# Patient Record
Sex: MALE | Race: WHITE | URBAN - METROPOLITAN AREA
[De-identification: names, ages, dates, MRNs, and addresses within clinical notes are randomized per-mention and may not be internally consistent; named-entity substitution may affect disease eponyms.]

---

## 2017-05-15 ENCOUNTER — OFFICE VISIT (OUTPATIENT)
Dept: FAMILY MEDICINE | Facility: CLINIC | Age: 47
End: 2017-05-15
Payer: COMMERCIAL

## 2017-05-15 ENCOUNTER — RADIANT APPOINTMENT (OUTPATIENT)
Dept: GENERAL RADIOLOGY | Facility: CLINIC | Age: 47
End: 2017-05-15
Attending: FAMILY MEDICINE
Payer: COMMERCIAL

## 2017-05-15 VITALS
HEIGHT: 71 IN | BODY MASS INDEX: 31.64 KG/M2 | OXYGEN SATURATION: 98 % | WEIGHT: 226 LBS | SYSTOLIC BLOOD PRESSURE: 131 MMHG | HEART RATE: 75 BPM | TEMPERATURE: 98 F | DIASTOLIC BLOOD PRESSURE: 85 MMHG

## 2017-05-15 DIAGNOSIS — R07.81 RIB PAIN ON LEFT SIDE: ICD-10-CM

## 2017-05-15 DIAGNOSIS — S20.212A CONTUSION OF RIB ON LEFT SIDE, INITIAL ENCOUNTER: Primary | ICD-10-CM

## 2017-05-15 PROCEDURE — 71101 X-RAY EXAM UNILAT RIBS/CHEST: CPT | Mod: LT

## 2017-05-15 PROCEDURE — 99203 OFFICE O/P NEW LOW 30 MIN: CPT | Performed by: FAMILY MEDICINE

## 2017-05-15 RX ORDER — TRAMADOL HYDROCHLORIDE 50 MG/1
50-100 TABLET ORAL EVERY 6 HOURS PRN
Qty: 30 TABLET | Refills: 0 | Status: SHIPPED | OUTPATIENT
Start: 2017-05-15 | End: 2019-06-26

## 2017-05-15 RX ORDER — MELOXICAM 15 MG/1
15 TABLET ORAL DAILY
Qty: 30 TABLET | Refills: 1 | Status: SHIPPED | OUTPATIENT
Start: 2017-05-15 | End: 2019-06-26

## 2017-05-15 RX ORDER — CYCLOBENZAPRINE HCL 10 MG
5-10 TABLET ORAL 3 TIMES DAILY PRN
Qty: 30 TABLET | Refills: 1 | Status: SHIPPED | OUTPATIENT
Start: 2017-05-15 | End: 2019-06-26

## 2017-05-15 ASSESSMENT — PAIN SCALES - GENERAL: PAINLEVEL: EXTREME PAIN (9)

## 2017-05-15 NOTE — LETTER
[facility]  [address] [place] [address]    May 15, 2017    Re: Anurag VARGAS Sulemabenigno  : 1970      To Whom it May Concern,     Please excuse Anurag from work through Monday, May 22. He has injuries to his left rib cage that prevent him from any activities involving lifting, twisting, or bending. He will follow up in clinic in one week for clearance.     Sincerely,         Lauren Engelmann, MD

## 2017-05-15 NOTE — NURSING NOTE
"Chief Complaint   Patient presents with     Rib Pain       Initial /85 (BP Location: Left arm, Patient Position: Chair, Cuff Size: Adult Large)  Pulse 75  Temp 98  F (36.7  C) (Oral)  Ht 5' 11\" (1.803 m)  Wt 226 lb (102.5 kg)  SpO2 98%  BMI 31.52 kg/m2 Estimated body mass index is 31.52 kg/(m^2) as calculated from the following:    Height as of this encounter: 5' 11\" (1.803 m).    Weight as of this encounter: 226 lb (102.5 kg).  Medication Reconciliation: complete   Melinda Herman MA      "
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
## 2017-05-15 NOTE — PATIENT INSTRUCTIONS
You can use meloxicam once daily for pain - do not use more than once per day, and do not use any over the counter ibuprofen while you're taking the medication.     Cyclobenzaprine is a muscle relaxer. Take half to a full tablet every 8 hours as needed for pain. It may make you tired.     Tramadol is a pain medication. It may make you tired, especially if you take it with cyclobenzaprine.     All of the medicines are ok to take on the same day.     Rib Contusion or Minor Fracture    A rib contusion is a bruise to one or more rib bones. It may cause pain, tenderness, swelling, and a purplish tint to the skin. There may be a sharp pain with each breath. A rib contusion takes anywhere from a few days to a few weeks to heal. A minor rib fracture or break may cause the same symptoms as a rib contusion. The small crack may not be seen on a regular chest X-ray. Treatment for both problems is the same.  Home care    You may use over-the-counter pain medicine to control pain, unless another pain medicine was prescribed. If you have chronic liver or kidney disease or ever had a stomach ulcer or GI bleeding, talk with your healthcare provider before using these medicines.    Rest. Do not lift anything heavy or do any activity that causes pain.    Apply an ice pack over the injured area for 15 to 20 minutes every 1 to 2 hours. You should do this for the first 24 to 48 hours. You can make an ice pack by filling a plastic bag that seals at the top with ice cubes and then wrapping it with a thin towel. Continue with ice packs as needed for the relief of pain and swelling.    The first 3 to 4 weeks of healing will be the most painful. If your pain is not under control with the treatment given, call your healthcare provider. Sometimes a stronger pain medicine may be needed. A nerve block can be done in case of severe pain. It will numb the nerve between the ribs.  Follow-up care  Follow up with your healthcare provider, or as  advised.  If X-rays were taken, you will be told of any new findings that may affect your care.  Call 911   Call 911 if you have:    Dizziness, weakness or fainting    Shortness of breath with or without chest discomfort    New or worsening pain  When to seek medical advice  Call your healthcare provider right away if any of these occur:    Fever of 100.4 F (38 C) or above lasting for 24 to 48 hours    Stomach pain    1781-2435 [employer]. [address] [place], [place] [address]. All rights reserved. This information is not intended as a substitute for professional medical care. Always follow your healthcare professional's instructions.

## 2017-05-15 NOTE — MR AVS SNAPSHOT
After Visit Summary   5/15/2017    Anurag Kaye    MRN: [id number]           Patient Information     Date Of Birth          1970        Visit Information        Provider Department      5/15/2017 11:20 AM Engelmann, Lauren Anneliese, MD [facility]        Today's Diagnoses     Rib pain on left side    -  1      Care Instructions    You can use meloxicam once daily for pain - do not use more than once per day, and do not use any over the counter ibuprofen while you're taking the medication.     Cyclobenzaprine is a muscle relaxer. Take half to a full tablet every 8 hours as needed for pain. It may make you tired.     Tramadol is a pain medication. It may make you tired, especially if you take it with cyclobenzaprine.     All of the medicines are ok to take on the same day.     Rib Contusion or Minor Fracture    A rib contusion is a bruise to one or more rib bones. It may cause pain, tenderness, swelling, and a purplish tint to the skin. There may be a sharp pain with each breath. A rib contusion takes anywhere from a few days to a few weeks to heal. A minor rib fracture or break may cause the same symptoms as a rib contusion. The small crack may not be seen on a regular chest X-ray. Treatment for both problems is the same.  Home care    You may use over-the-counter pain medicine to control pain, unless another pain medicine was prescribed. If you have chronic liver or kidney disease or ever had a stomach ulcer or GI bleeding, talk with your healthcare provider before using these medicines.    Rest. Do not lift anything heavy or do any activity that causes pain.    Apply an ice pack over the injured area for 15 to 20 minutes every 1 to 2 hours. You should do this for the first 24 to 48 hours. You can make an ice pack by filling a plastic bag that seals at the top with ice cubes and then wrapping it with a thin towel. Continue with ice packs as needed for the relief of pain  and swelling.    The first 3 to 4 weeks of healing will be the most painful. If your pain is not under control with the treatment given, call your healthcare provider. Sometimes a stronger pain medicine may be needed. A nerve block can be done in case of severe pain. It will numb the nerve between the ribs.  Follow-up care  Follow up with your healthcare provider, or as advised.  If X-rays were taken, you will be told of any new findings that may affect your care.  Call 911   Call 911 if you have:    Dizziness, weakness or fainting    Shortness of breath with or without chest discomfort    New or worsening pain  When to seek medical advice  Call your healthcare provider right away if any of these occur:    Fever of 100.4 F (38 C) or above lasting for 24 to 48 hours    Stomach pain    [phone] [employer]. [address] [place] [address]. All rights reserved. This information is not intended as a substitute for professional medical care. Always follow your healthcare professional's instructions.              Follow-ups after your visit        Your next 10 appointments already scheduled     May 16, 2017  2:40 PM CDT   PHYSICAL with Navin Spence MD   [facility]    [address] [place] [id number] [phone]              Who to contact     If you have questions or need follow up information about today's clinic visit or your schedule please contact [facility] directly at [phone].  Normal or non-critical lab and imaging results will be communicated to you by MyChart, letter or phone within 4 business days after the clinic has received the results. If you do not hear from us within 7 days, please contact the clinic through MyChart or phone. If you have a critical or abnormal lab result, we will notify you by phone as soon as possible.  Submit refill requests through  "[employer] or call your pharmacy and they will forward the refill request to us. Please allow 3 business days for your refill to be completed.          Additional Information About Your Visit        MyChart Information     immatics biotechnologieshart lets you send messages to your doctor, view your test results, renew your prescriptions, schedule appointments and more. To sign up, go to [email or web address] . Click on \"Log in\" on the left side of the screen, which will take you to the Welcome page. Then click on \"Sign up Now\" on the right side of the page.     You will be asked to enter the access code listed below, as well as some personal information. Please follow the directions to create your username and password.     Your access code is: [email or web address]  Expires: 2017 12:06 PM     Your access code will  in 90 days. If you need help or a new code, please call your [facility] clinic or [phone].        Care EveryWhere ID     This is your Care EveryWhere ID. This could be used by other organizations to access your [facility] medical records  [id number]        Your Vitals Were     Pulse Temperature Height Pulse Oximetry BMI (Body Mass Index)       75 98  F (36.7  C) (Oral) 5' 11\" (1.803 m) 98% 31.52 kg/m2        Blood Pressure from Last 3 Encounters:   05/15/17 131/85   13 122/66    Weight from Last 3 Encounters:   05/15/17 226 lb (102.5 kg)   13 197 lb (89.4 kg)               Primary Care Provider    None Specified       No primary provider on file.        Thank you!     Thank you for choosing [facility]  for your care. Our goal is always to provide you with excellent care. Hearing back from our patients is one way we can continue to improve our services. Please take a few minutes to complete the written survey that you may receive in the mail after your visit with us. Thank you!             Your Updated Medication List - Protect others around you: Learn how to safely use, store and throw " away your medicines at www.disposemymeds.org.      Notice  As of 5/15/2017 12:06 PM    You have not been prescribed any medications.

## 2017-05-15 NOTE — PROGRESS NOTES
Patient coming in for physical tomorrow - reviewed prelim results with him today. No change in management.     Lauren Engelmann, MD

## 2017-05-16 ENCOUNTER — OFFICE VISIT (OUTPATIENT)
Dept: FAMILY MEDICINE | Facility: CLINIC | Age: 47
End: 2017-05-16
Payer: COMMERCIAL

## 2017-05-16 VITALS
WEIGHT: 223 LBS | OXYGEN SATURATION: 97 % | DIASTOLIC BLOOD PRESSURE: 74 MMHG | HEART RATE: 72 BPM | TEMPERATURE: 97.7 F | HEIGHT: 72 IN | SYSTOLIC BLOOD PRESSURE: 111 MMHG | BODY MASS INDEX: 30.2 KG/M2

## 2017-05-16 DIAGNOSIS — Z00.00 ROUTINE GENERAL MEDICAL EXAMINATION AT A HEALTH CARE FACILITY: Primary | ICD-10-CM

## 2017-05-16 DIAGNOSIS — E66.3 OVERWEIGHT (BMI 25.0-29.9): ICD-10-CM

## 2017-05-16 PROCEDURE — 99396 PREV VISIT EST AGE 40-64: CPT | Performed by: FAMILY MEDICINE

## 2017-05-16 ASSESSMENT — PAIN SCALES - GENERAL: PAINLEVEL: NO PAIN (0)

## 2017-05-16 NOTE — MR AVS SNAPSHOT
After Visit Summary   5/16/2017    Anurag Kaye    MRN: [id number]           Patient Information     Date Of Birth          1970        Visit Information        Provider Department      5/16/2017 2:40 PM Navin pSence MD [facility]        Today's Diagnoses     Routine general medical examination at a health care facility    -  1    Overweight (BMI 25.0-29.9)          Care Instructions      Preventive Health Recommendations  Male Ages 40 to 49    Yearly exam:             See your health care provider every year in order to  o   Review health changes.   o   Discuss preventive care.    o   Review your medicines if your doctor has prescribed any.    You should be tested each year for STDs (sexually transmitted diseases) if you re at risk.     Have a cholesterol test every 5 years.     Have a colonoscopy (test for colon cancer) if someone in your family has had colon cancer or polyps before age 50.     After age 45, have a diabetes test (fasting glucose). If you are at risk for diabetes, you should have this test every 3 years.      Talk with your health care provider about whether or not a prostate cancer screening test (PSA) is right for you.    Shots: Get a flu shot each year. Get a tetanus shot every 10 years.     Nutrition:    Eat at least 5 servings of fruits and vegetables daily.     Eat whole-grain bread, whole-wheat pasta and brown rice instead of white grains and rice.     Talk to your provider about Calcium and Vitamin D.     Lifestyle    Exercise for at least 150 minutes a week (30 minutes a day, 5 days a week). This will help you control your weight and prevent disease.     Limit alcohol to one drink per day.     No smoking.     Wear sunscreen to prevent skin cancer.     See your dentist every six months for an exam and cleaning.            Follow-ups after your visit        Your next 10 appointments already scheduled     May 22, 2017 11:20 AM CDT   SHORT  "with Navin Spence MD   [facility]    [address] [facility] [address] [phone]              Who to contact     If you have questions or need follow up information about today's clinic visit or your schedule please contact [facility] directly at [phone].  Normal or non-critical lab and imaging results will be communicated to you by MyChart, letter or phone within 4 business days after the clinic has received the results. If you do not hear from us within 7 days, please contact the clinic through MyChart or phone. If you have a critical or abnormal lab result, we will notify you by phone as soon as possible.  Submit refill requests through [employer] or call your pharmacy and they will forward the refill request to us. Please allow 3 business days for your refill to be completed.          Additional Information About Your Visit        Pay by Shopping (deal united)[employer] Information     [employer] lets you send messages to your doctor, view your test results, renew your prescriptions, schedule appointments and more. To sign up, go to [email or web address] . Click on \"Log in\" on the left side of the screen, which will take you to the Welcome page. Then click on \"Sign up Now\" on the right side of the page.     You will be asked to enter the access code listed below, as well as some personal information. Please follow the directions to create your username and password.     Your access code is: [email or web address]  Expires: 2017 12:06 PM     Your access code will  in 90 days. If you need help or a new code, please call your [facility] or [phone].        Care EveryWhere ID     This is your Care EveryWhere ID. This could be used by other organizations to access your [facility] medical records  [id number]        Your Vitals Were     Pulse Temperature Height Pulse Oximetry BMI (Body Mass Index)       72 97.7  F " "(36.5  C) (Oral) 5' 11.5\" (1.816 m) 97% 30.67 kg/m2        Blood Pressure from Last 3 Encounters:   05/16/17 111/74   05/15/17 131/85   09/12/13 122/66    Weight from Last 3 Encounters:   05/16/17 223 lb (101.2 kg)   05/15/17 226 lb (102.5 kg)   09/12/13 197 lb (89.4 kg)              Today, you had the following     No orders found for display       Primary Care Provider    None Specified       No primary provider on file.        Thank you!     Thank you for choosing [facility]  for your care. Our goal is always to provide you with excellent care. Hearing back from our patients is one way we can continue to improve our services. Please take a few minutes to complete the written survey that you may receive in the mail after your visit with us. Thank you!             Your Updated Medication List - Protect others around you: Learn how to safely use, store and throw away your medicines at www.disposemymeds.org.          This list is accurate as of: 5/16/17  3:21 PM.  Always use your most recent med list.                   Brand Name Dispense Instructions for use    cyclobenzaprine 10 MG tablet    FLEXERIL    30 tablet    Take 0.5-1 tablets (5-10 mg) by mouth 3 times daily as needed for muscle spasms       meloxicam 15 MG tablet    MOBIC    30 tablet    Take 1 tablet (15 mg) by mouth daily       traMADol 50 MG tablet    ULTRAM    30 tablet    Take 1-2 tablets ( mg) by mouth every 6 hours as needed for pain maximum 8 tablet(s) per day         "

## 2017-05-16 NOTE — NURSING NOTE
"Chief Complaint   Patient presents with     Physical       Initial /74  Pulse 72  Temp 97.7  F (36.5  C) (Oral)  Ht 5' 11.5\" (1.816 m)  Wt 223 lb (101.2 kg)  SpO2 97%  BMI 30.67 kg/m2 Estimated body mass index is 30.67 kg/(m^2) as calculated from the following:    Height as of this encounter: 5' 11.5\" (1.816 m).    Weight as of this encounter: 223 lb (101.2 kg).  Medication Reconciliation: complete   Madeleine THOMPSON      "

## 2017-05-16 NOTE — PROGRESS NOTES
SUBJECTIVE:     CC: Anurag Kaye is an 47 year old male who presents for preventative health visit.     Healthy Habits:    Do you get at least three servings of calcium containing foods daily (dairy, green leafy vegetables, etc.)? yes    Amount of exercise or daily activities, outside of work: 3 day(s) per week for 1 hr    Problems taking medications regularly No    Medication side effects: No    Have you had an eye exam in the past two years? no    Do you see a dentist twice per year? yes    Do you have sleep apnea, excessive snoring or daytime drowsiness? 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    Last PSA: No results found for: PSA    Recent Labs   Lab Test  09/12/13   1353   CHOL  134   HDL  58   LDL  49   TRIG  134   CHOLHDLRATIO  2.3     Current Outpatient Prescriptions   Medication Sig Dispense Refill     meloxicam (MOBIC) 15 MG tablet Take 1 tablet (15 mg) by mouth daily 30 tablet 1     cyclobenzaprine (FLEXERIL) 10 MG tablet Take 0.5-1 tablets (5-10 mg) by mouth 3 times daily as needed for muscle spasms 30 tablet 1     traMADol (ULTRAM) 50 MG tablet Take 1-2 tablets ( mg) by mouth every 6 hours as needed for pain maximum 8 tablet(s) per day 30 tablet 0       Reviewed orders with patient. Reviewed health maintenance and updated orders accordingly - Yes    Reviewed and updated as needed this visit by clinical staff          Reviewed and updated as needed this visit by Provider        Past Medical History:   Diagnosis Date     Hyperlipidemia LDL goal <160 9/17/2013      Past Surgical History:   Procedure Laterality Date     APPENDECTOMY OPEN      age 18 approximate   right ankle fracture 2009    ROS:  C: NEGATIVE for fever, chills, change in weight  I: NEGATIVE for worrisome rashes, moles or lesions  E: NEGATIVE for vision changes or irritation  ENT: NEGATIVE for ear, mouth and throat problems  R: NEGATIVE for significant cough or SOB  CV: NEGATIVE for chest pain, palpitations or peripheral edema  GI: NEGATIVE for nausea, abdominal pain, heartburn, or change in bowel habits   male: negative for dysuria, hematuria, decreased urinary stream, erectile dysfunction, urethral discharge  M: NEGATIVE for significant arthralgias or myalgia  N: NEGATIVE for weakness, dizziness or paresthesias  P: NEGATIVE for changes in mood or affect    Problem list, Medication list, Allergies, and Medical/Social/Surgical histories reviewed in EPIC and updated as appropriate.  Labs reviewed in EPIC  BP Readings from Last 3 Encounters:   05/16/17 111/74   05/15/17 131/85   09/12/13 122/66    Wt Readings from Last 3 Encounters:   05/16/17 223 lb (101.2 kg)   05/15/17 226 lb (102.5 kg)   09/12/13 197 lb (89.4 kg)                  Patient Active Problem List   Diagnosis     Hyperlipidemia LDL goal <160     Past Surgical History:   Procedure Laterality Date     APPENDECTOMY OPEN      age 18 approximate       Social History   Substance Use Topics     Smoking status: Former Smoker     Types: Cigarettes     Smokeless tobacco: Not on file     Alcohol use Yes     Family History   Problem Relation Age of Onset     DIABETES Maternal Grandmother          Current Outpatient Prescriptions   Medication Sig Dispense Refill     meloxicam (MOBIC) 15 MG tablet Take 1 tablet (15 mg) by mouth daily 30 tablet 1     cyclobenzaprine (FLEXERIL) 10 MG tablet Take 0.5-1 tablets (5-10  "mg) by mouth 3 times daily as needed for muscle spasms 30 tablet 1     traMADol (ULTRAM) 50 MG tablet Take 1-2 tablets ( mg) by mouth every 6 hours as needed for pain maximum 8 tablet(s) per day 30 tablet 0     No Known Allergies  OBJECTIVE:     /74  Pulse 72  Temp 97.7  F (36.5  C) (Oral)  Ht 5' 11.5\" (1.816 m)  Wt 223 lb (101.2 kg)  SpO2 97%  BMI 30.67 kg/m2  EXAM:  GENERAL: healthy, alert and no distress  Overweight   EYES: Eyes grossly normal to inspection, PERRL and conjunctivae and sclerae normal  HENT: ear canals and TM's normal, nose and mouth without ulcers or lesions  NECK: no adenopathy, no asymmetry, masses, or scars and thyroid normal to palpation  RESP: lungs clear to auscultation - no rales, rhonchi or wheezes  CV: regular rate and rhythm, normal S1 S2, no S3 or S4, no murmur, click or rub, no peripheral edema and peripheral pulses strong  ABDOMEN: soft, nontender, no hepatosplenomegaly, no masses and bowel sounds normal  MS: no gross musculoskeletal defects noted, no edema  SKIN: no suspicious lesions or rashes  NEURO: Normal strength and tone, mentation intact and speech normal  PSYCH: mentation appears normal, affect normal/bright    ASSESSMENT/PLAN:         ICD-10-CM    1. Routine general medical examination at a health care facility Z00.00    2. Overweight (BMI 25.0-29.9) E66.3        COUNSELING:  Reviewed preventive health counseling, as reflected in patient instructions       Regular exercise       Healthy diet/nutrition         reports that he is a non-smoker but has been exposed to tobacco smoke. He does not have any smokeless tobacco history on file.    Estimated body mass index is 31.52 kg/(m^2) as calculated from the following:    Height as of 5/15/17: 5' 11\" (1.803 m).    Weight as of 5/15/17: 226 lb (102.5 kg).   Weight management plan: Discussed healthy diet and exercise guidelines and patient will follow up in 12 months in clinic to re-evaluate.    Counseling " Resources:  ATP IV Guidelines  Pooled Cohorts Equation Calculator  FRAX Risk Assessment  ICSI Preventive Guidelines  Dietary Guidelines for Americans, 2010  USDA's MyPlate  ASA Prophylaxis  Lung CA Screening    Navin Spence MD  [facility]

## 2017-05-22 ENCOUNTER — OFFICE VISIT (OUTPATIENT)
Dept: FAMILY MEDICINE | Facility: CLINIC | Age: 47
End: 2017-05-22
Payer: COMMERCIAL

## 2017-05-22 VITALS
OXYGEN SATURATION: 96 % | TEMPERATURE: 97.4 F | HEART RATE: 84 BPM | BODY MASS INDEX: 30.81 KG/M2 | SYSTOLIC BLOOD PRESSURE: 118 MMHG | WEIGHT: 224 LBS | DIASTOLIC BLOOD PRESSURE: 82 MMHG

## 2017-05-22 DIAGNOSIS — R07.81 RIB PAIN ON LEFT SIDE: Primary | ICD-10-CM

## 2017-05-22 PROCEDURE — 99214 OFFICE O/P EST MOD 30 MIN: CPT | Performed by: FAMILY MEDICINE

## 2017-05-22 RX ORDER — TRAMADOL HYDROCHLORIDE 50 MG/1
50-100 TABLET ORAL EVERY 8 HOURS PRN
Qty: 30 TABLET | Refills: 0 | Status: SHIPPED | OUTPATIENT
Start: 2017-05-22 | End: 2019-06-26

## 2017-05-22 NOTE — NURSING NOTE
"Chief Complaint   Patient presents with     RECHECK     rib pain       Initial /82 (BP Location: Left arm, Patient Position: Chair, Cuff Size: Adult Regular)  Pulse 84  Temp 97.4  F (36.3  C) (Oral)  Wt 224 lb (101.6 kg)  SpO2 96%  BMI 30.81 kg/m2 Estimated body mass index is 30.81 kg/(m^2) as calculated from the following:    Height as of 5/16/17: 5' 11.5\" (1.816 m).    Weight as of this encounter: 224 lb (101.6 kg).  Medication Reconciliation: complete   Trina See DONNA Connor      "

## 2017-05-22 NOTE — PATIENT INSTRUCTIONS
Take the cyclobenzaprine only at bedtime.  This is a muscle relaxer and it makes you drowsy.     Take the Meloxicam daily for now. It does help dull the pain, but is not a narcotic.     Take the tramadol only as needed and do not exceed 6 pills per day.  This is the one that could be habit forming.

## 2017-05-22 NOTE — LETTER
[address] [address]  Phone: [phone]    May 22, 2017        Anurag Kaye  [facility] [facility] [address]          To whom it may concern:    RE: Anurag Kaye    Patient may return to work 5/31/2017 with the following:  Light duty-unable to bend, stoop or twist for 2 weeks until 6/14/2017following return .Please contact me for questions or concerns.  No lifting or twistng   Weight lifting restricted to 20 lbs of lifting and 30 lbs of pushing.     Sincerely,        Navin Spence MD

## 2017-05-22 NOTE — PROGRESS NOTES
SUBJECTIVE:                                                    Anurag Kaye is a 47 year old male who presents to clinic today for the following health issues:      Patient is here to follow up on rib pain. Patient feels a little better but still some pain.    O; /82 (BP Location: Left arm, Patient Position: Chair, Cuff Size: Adult Regular)  Pulse 84  Temp 97.4  F (36.3  C) (Oral)  Wt 224 lb (101.6 kg)  SpO2 96%  BMI 30.81 kg/m2    Chest wall normal to inspection and palpation. Good excursion bilaterally. Lungs clear to auscultation. Good air movement bilaterally without rales, wheezes, or rhonchi.   Regular rate and  rhythm. S1 and S2 normal, no murmurs, clicks, gallops or rubs. No edema or JVD.      Reviewed and updated as needed this visit by clinical staff5/31/2017  Tobacco  Allergies  Meds  Med Hx  Surg Hx  Fam Hx  Soc Hx      Reviewed and updated as needed this visit by Provider           ICD-10-CM    1. Rib pain on left side R07.81 traMADol (ULTRAM) 50 MG tablet       Patient works in the belly of the pain

## 2019-06-26 ENCOUNTER — OFFICE VISIT (OUTPATIENT)
Dept: FAMILY MEDICINE | Facility: CLINIC | Age: 49
End: 2019-06-26
Payer: COMMERCIAL

## 2019-06-26 VITALS
DIASTOLIC BLOOD PRESSURE: 78 MMHG | SYSTOLIC BLOOD PRESSURE: 106 MMHG | HEART RATE: 99 BPM | BODY MASS INDEX: 29.29 KG/M2 | TEMPERATURE: 98.6 F | OXYGEN SATURATION: 99 % | WEIGHT: 213 LBS

## 2019-06-26 DIAGNOSIS — J06.9 VIRAL URI WITH COUGH: Primary | ICD-10-CM

## 2019-06-26 DIAGNOSIS — M62.830 BACK MUSCLE SPASM: ICD-10-CM

## 2019-06-26 PROCEDURE — 99213 OFFICE O/P EST LOW 20 MIN: CPT | Performed by: NURSE PRACTITIONER

## 2019-06-26 RX ORDER — CYCLOBENZAPRINE HCL 10 MG
10 TABLET ORAL 3 TIMES DAILY PRN
Qty: 30 TABLET | Refills: 0 | Status: SHIPPED | OUTPATIENT
Start: 2019-06-26

## 2019-06-26 ASSESSMENT — PAIN SCALES - GENERAL: PAINLEVEL: SEVERE PAIN (6)

## 2019-06-26 NOTE — LETTER
June 26, 2019      Anurag Leonepanchopeter  [address] [address]        To Whom It May Concern:    Anurag Kaye  was seen on 06/26/19.  Please excuse him from 06/24/19- 7/1/19 due to injury.        Sincerely,        PABLITO Barrett CNP

## 2019-06-26 NOTE — PROGRESS NOTES
Subjective     Anurag Kaye is a 49 year old male who presents to clinic today for the following health issues:    HPI     RESPIRATORY SYMPTOMS      Duration: started Sunday    Description  facial pain/pressure, headaches, cough- worse when laying down, tactile fever- chills and myalgias    Severity: moderate    Accompanying signs and symptoms: None    History (predisposing factors):  tobacco abuse on occasion    Precipitating or alleviating factors: None    Therapies tried and outcome:  Aspirin and OTC cough medication which haven't helped    Slight SOB, left lower shooting back muscle spasm/pain when coughing. Mild sore throat from coughing.        Patient Active Problem List   Diagnosis     Hyperlipidemia LDL goal <160     Overweight (BMI 25.0-29.9)     Past Surgical History:   Procedure Laterality Date     APPENDECTOMY OPEN      age 18 approximate       Social History     Tobacco Use     Smoking status: Former Smoker     Types: Cigarettes     Smokeless tobacco: Never Used   Substance Use Topics     Alcohol use: Yes     Family History   Problem Relation Age of Onset     Diabetes Maternal Grandmother            Reviewed and updated as needed this visit by Provider         Review of Systems   ROS COMP: Constitutional, HEENT, cardiovascular, pulmonary, gi and gu systems are negative, except as otherwise noted.      Objective    /78 (BP Location: Left arm, Patient Position: Chair, Cuff Size: Adult Large)   Pulse 99   Temp 98.6  F (37  C) (Oral)   Wt 96.6 kg (213 lb)   SpO2 99%   BMI 29.29 kg/m    Body mass index is 29.29 kg/m .  Physical Exam   GENERAL: healthy, alert and no distress  EYES: Eyes grossly normal to inspection, PERRL and conjunctivae and sclerae normal  HENT: ear canals and TM's normal, nose and mouth without ulcers or lesions  NECK: no adenopathy, no asymmetry, masses, or scars and thyroid normal to palpation  RESP: lungs clear to auscultation - no rales, rhonchi or wheezes  CV: regular  rate and rhythm, normal S1 S2, no S3 or S4, no murmur, click or rub, no peripheral edema and peripheral pulses strong  MS: Right para lumbar muscles tender to palpation    Diagnostic Test Results:  Labs reviewed in Epic  No results found for this or any previous visit (from the past 24 hour(s)).        Assessment & Plan     1. Viral URI with cough  Supportive cares- push fluids and rest. Reviewed warning signs and when to follow up. May use Ibuprofen, Mucinex as desired.    2. Back muscle spasm  Recommend Ibuprofen 600 mg every 6 hours for URI symptoms and back pain. May use Flexeril PRN- do not drive after taking.  Work excuse written due to work as .  - cyclobenzaprine (FLEXERIL) 10 MG tablet; Take 1 tablet (10 mg) by mouth 3 times daily as needed for muscle spasms  Dispense: 30 tablet; Refill: 0       See Patient Instructions    Return in about 1 month (around 7/26/2019) for Physical with PCP.    PABLITO Barrett [facility]

## 2019-07-05 ENCOUNTER — TELEPHONE (OUTPATIENT)
Dept: FAMILY MEDICINE | Facility: CLINIC | Age: 49
End: 2019-07-05

## 2019-07-05 NOTE — TELEPHONE ENCOUNTER
Patient will need an appointment, as Oriana Adamson CNP is now out of office.    Beverly Lerner CNP

## 2019-07-05 NOTE — TELEPHONE ENCOUNTER
Form came in via fax to be completed by the provider: [employer] [facility]    Who is the form from? [employer] [facility]   (if other please explain)  This was faxed to [facility]  Where was the form placed? Given to physician/ covering provider  What number is listed as a contact on the form? Jennifer Chavez    Claim # [id number]    Please fax to [phone]    Has the patient signed a consent form for release of information? NO    Additional comments: Last office visit on 6/26/2019    Type of letter, form or note: disability

## 2019-07-05 NOTE — TELEPHONE ENCOUNTER
Called and left a message for Anurag to call back and give more information as to what the forms are regarding. What dates are needed for the leave and a bit more. Please help him get connected with the care team. Will send this message on to the covering provider pool to advise if patient will need an appointment to complete forms. Rosa Elena Arevalo,

## 2019-07-09 NOTE — TELEPHONE ENCOUNTER
Left a message for Anurag to call the clinic to schedule a appointment. Please help the patient schedule for Provider appointment. Rosa Elena Arevalo,     Closed the encounter     Forms in Red team blue book.

## 2019-07-11 ENCOUNTER — TELEPHONE (OUTPATIENT)
Dept: FAMILY MEDICINE | Facility: CLINIC | Age: 49
End: 2019-07-11

## 2019-07-11 NOTE — TELEPHONE ENCOUNTER
See 7-5-19, telephone encounter.  I called patient and informed him that Oriana Adamson CNP is currently out on maternity leave and another provider said he would need to be seen.  Patient stated that his work already has the work note from Oriana.  They just need their form filled out, and he feels it's rather ridiculous that he has to do this since they already have the information. He asked that we fill out the form and fax it back to them and if he gets any push back from them, he will schedule an appointment.    Form completed.  Copy of 6-26-19, letter written by Oriana, and copy of her office note from 6-26-19, faxed to Jennifer Chavez/Supriya to fax number [phone].  Claudia Esquivel,

## 2019-07-11 NOTE — TELEPHONE ENCOUNTER
Patient calling regarding paperwork that was sent over from [facility], states he doesn't think he needs to come in for another visit. States the form should be straight forward. Please call to discuss.